# Patient Record
(demographics unavailable — no encounter records)

---

## 2024-11-12 NOTE — REVIEW OF SYSTEMS
[Eye Discharge] : eye discharge [Eye Redness] : eye redness [Nasal Congestion] : nasal congestion [Penile Tenderness] : penile tenderness [Penile Reddening] : penile reddening

## 2024-11-12 NOTE — DISCUSSION/SUMMARY
[FreeTextEntry1] : symptomatic treatment of eyes, warm soaks, hot wash linens, hand washing,  sx treatment, penis care with water wipes, barrier after voiding, increased fluids, mom knows to call if symptoms worsening

## 2024-11-12 NOTE — PHYSICAL EXAM
[Conjuctival Injection] : conjunctival injection [Discharge] : discharge [Left] : (left) [Mucoid Discharge] : mucoid discharge [NL] : clear to auscultation bilaterally [Soft] : soft [Tender] : nontender [Normal External Genitalia] : normal external genitalia [FreeTextEntry9] : GT button in place [FreeTextEntry6] : mild redness without rash at penis, vaseline applied

## 2024-11-12 NOTE — HISTORY OF PRESENT ILLNESS
[de-identified] : discharge left eye [FreeTextEntry6] : Garrett is here with 2 day history of congestion, decreased appetite and intermittent yellow discharge left eye, no fever, now with redness. ALSO noted redness at penile redness at sulcus.

## 2024-12-23 NOTE — HISTORY OF PRESENT ILLNESS
[de-identified] : Nano Pediatric Peptide 1.5cal/ml via g tube the patient is only getting 1 feeding per day through the G-tube.  210 mL. [de-identified] : No risks identified

## 2024-12-23 NOTE — DISCUSSION/SUMMARY
[Normal Growth] : growth [Normal Development] : development [FreeTextEntry1] : Right now, feeding is the issue.  He is doing much better.  He is eating solid foods.  The goal would be to completely stop using the G-tube.  He has a visit with GI next week.

## 2024-12-23 NOTE — HISTORY OF PRESENT ILLNESS
[de-identified] : Nano Pediatric Peptide 1.5cal/ml via g tube the patient is only getting 1 feeding per day through the G-tube.  210 mL. [de-identified] : No risks identified

## 2024-12-31 NOTE — HISTORY OF PRESENT ILLNESS
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 Eric Beach on 2/10/23 and presents for f/u evaluation. 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 His postop period was uneventful and he followed with [facility] for his initial postop visit.  He is s/p ileostomy reversal and GT tube placement. He is following with PEDS GI at [facility] for intestinal rehab and making slow progress with weight gain. He gets ST, OT, PT and is progressing nicely.  DUTCH is doing well from a cardiac standpoint per his mother. She denies cyanosis or tachypnea. He runs around without any shortness of breath. DUTCH is almost completely transitioned to po feeds.

## 2024-12-31 NOTE — CONSULT LETTER
[Today's Date] : [unfilled] [Name] : Name: [unfilled] [] : : ~~ [Today's Date:] : [unfilled] [Dear  ___:] : Dear Dr. [unfilled]: [Consult] : I had the pleasure of evaluating your patient, [unfilled]. My full evaluation follows. [Consult - Single Provider] : Thank you very much for allowing me to participate in the care of this patient. If you have any questions, please do not hesitate to contact me. [Sincerely,] : Sincerely, [DrKeagan  ___] : Dr. GUERRA [FreeTextEntry4] : HALLIE SANCHEZ  [de-identified] : Vladimir Chakraborty MD, FAAP, FACC  Pediatric Cardiologist  of Pediatrics [facility]

## 2024-12-31 NOTE — REASON FOR VISIT
[Follow-Up] : a follow-up visit for [Mother] : mother [Medical Records] : medical records [FreeTextEntry3] : s/p TOF Repair

## 2024-12-31 NOTE — DISCUSSION/SUMMARY
[FreeTextEntry1] : DUTCH is doing well from a cardiovascular standpoint. His anatomic repair is excellent without any significant residual cardiac issues. His biventricular function is qualitatively normal. I reassured his mother that DUTCH is doing well from a cardiac standpoint and that at this point there is no need for a routine cath in the foreseeable future.  DUTCH may participate in all physical activities without restriction.  DUTCH should follow-up in 12 months or sooner if any concerns arise.    [Needs SBE Prophylaxis] : [unfilled]  needs bacterial endocarditis prophylaxis. SBE prophylaxis is indicated for dental and invasive ENT procedures. (Circulation. 2007; 116: 9623-8500) [May participate in all age-appropriate activities] : [unfilled] May participate in all age-appropriate activities.

## 2024-12-31 NOTE — CONSULT LETTER
[Today's Date] : [unfilled] [Name] : Name: [unfilled] [] : : ~~ [Today's Date:] : [unfilled] [Dear  ___:] : Dear Dr. [unfilled]: [Consult] : I had the pleasure of evaluating your patient, [unfilled]. My full evaluation follows. [Consult - Single Provider] : Thank you very much for allowing me to participate in the care of this patient. If you have any questions, please do not hesitate to contact me. [Sincerely,] : Sincerely, [DrKeagan  ___] : Dr. GUERRA [FreeTextEntry4] : HALLIE SANCHEZ  [de-identified] : Vladimir Chakraborty MD, FAAP, FACC  Pediatric Cardiologist  of Pediatrics [facility]

## 2024-12-31 NOTE — DISCUSSION/SUMMARY
[FreeTextEntry1] : DUTCH is doing well from a cardiovascular standpoint. His anatomic repair is excellent without any significant residual cardiac issues. His biventricular function is qualitatively normal. I reassured his mother that DUTCH is doing well from a cardiac standpoint and that at this point there is no need for a routine cath in the foreseeable future.  DUTCH may participate in all physical activities without restriction.  DUTCH should follow-up in 12 months or sooner if any concerns arise.    [Needs SBE Prophylaxis] : [unfilled]  needs bacterial endocarditis prophylaxis. SBE prophylaxis is indicated for dental and invasive ENT procedures. (Circulation. 2007; 116: 1785-2143) [May participate in all age-appropriate activities] : [unfilled] May participate in all age-appropriate activities.

## 2024-12-31 NOTE — CONSULT LETTER
[Today's Date] : [unfilled] [Name] : Name: [unfilled] [] : : ~~ [Today's Date:] : [unfilled] [Dear  ___:] : Dear Dr. [unfilled]: [Consult] : I had the pleasure of evaluating your patient, [unfilled]. My full evaluation follows. [Consult - Single Provider] : Thank you very much for allowing me to participate in the care of this patient. If you have any questions, please do not hesitate to contact me. [Sincerely,] : Sincerely, [DrKeagan  ___] : Dr. GUERRA [FreeTextEntry4] : HALLIE SANCHEZ  [de-identified] : Vladimir Chakraborty MD, FAAP, FACC  Pediatric Cardiologist  of Pediatrics [facility] [place]

## 2024-12-31 NOTE — CARDIOLOGY SUMMARY
[Today's Date] : [unfilled] [FreeTextEntry1] : Normal sinus rhythm. Normal axis and intervals without chamber enlargement or hypertrophy. Right ventricular conduction delay. HR (bpm): 146 [FreeTextEntry2] : 1. Tetralogy of Fallot with pulmonary atresia and major aortopulmonary collateral arteries, s/p unifocalization of multiple aortopulmonary collaterals, status post closure of anterior malalignment ventricular septal defect and placement of valved right ventricle to pulmonary artery conduit. 2. 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 3. No residual peripatch defect identified. 4. There is no stenosis and mild regurgitation of the RV-PA aortic homograft. 5. Normal Doppler profile in main pulmonary artery, normal Doppler profile in right pulmonary artery and normal Doppler profile in left pulmonary artery. 6. Normal left ventricular size, morphology and systolic function. 7. Paradoxical septal motion of interventricular septum. 8. Normal right ventricular morphology with qualitatively normal size and systolic function. 9. Mildly dilated aortic root. 10. Trivial aortic valve regurgitation. 11. No pericardial effusion. 12. There has been no significant interval change.

## 2024-12-31 NOTE — HISTORY OF PRESENT ILLNESS
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 Eric Beach on 2/10/23 and presents for f/u evaluation. 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 His postop period was uneventful and he followed with [facility] for his initial postop visit.  He is s/p ileostomy reversal and GT tube placement. He is following with PEDS GI at [facility] for intestinal rehab and making slow progress with weight gain. He gets ST, OT, PT and is progressing nicely.  DUTCH is doing well from a cardiac standpoint per his mother. She denies cyanosis or tachypnea. He runs around without any shortness of breath. DUTCH is almost completely transitioned to po feeds.

## 2024-12-31 NOTE — PHYSICAL EXAM
[General Appearance - Alert] : alert [General Appearance - In No Acute Distress] : in no acute distress [General Appearance - Well Nourished] : well nourished [General Appearance - Well Developed] : well developed [General Appearance - Well-Appearing] : well appearing [Appearance Of Head] : the head was normocephalic [Facies] : there were no dysmorphic facial features [Sclera] : the conjunctiva were normal [Outer Ear] : the ears and nose were normal in appearance [Examination Of The Oral Cavity] : mucous membranes were moist and pink [Auscultation Breath Sounds / Voice Sounds] : breath sounds clear to auscultation bilaterally [Normal Chest Appearance] : the chest was normal in appearance [Chest Surgical / Traumatic Scar] : chest incision well healed [Apical Impulse] : quiet precordium with normal apical impulse [Heart Rate And Rhythm] : normal heart rate and rhythm [Heart Sounds] : normal S1 and S2 [Heart Sounds Gallop] : no gallops [Heart Sounds Pericardial Friction Rub] : no pericardial rub [Edema] : no edema [Arterial Pulses] : normal upper and lower extremity pulses with no pulse delay [Heart Sounds Click] : no clicks [Capillary Refill Test] : normal capillary refill [Systolic] : systolic [I] : a grade 1/6  [LUSB] : LUSB [Ejection] : ejection [Abdomen Soft] : soft [Nondistended] : nondistended [Abdomen Tenderness] : non-tender [Feeding Tube] : a feeding tube was present [Feeding Tube G] : (G-tube) [Normal] : normal [Nail Clubbing] : no clubbing  or cyanosis of the fingers [Motor Tone] : normal muscle strength and tone [] : no rash [Skin Lesions] : no lesions [Skin Turgor] : normal turgor [Demonstrated Behavior - Infant Nonreactive To Parents] : interactive [Mood] : mood and affect were appropriate for age [Demonstrated Behavior] : normal behavior

## 2024-12-31 NOTE — DISCUSSION/SUMMARY
[FreeTextEntry1] : DUTCH is doing well from a cardiovascular standpoint. His anatomic repair is excellent without any significant residual cardiac issues. His biventricular function is qualitatively normal. I reassured his mother that DUTCH is doing well from a cardiac standpoint and that at this point there is no need for a routine cath in the foreseeable future.  DUTCH may participate in all physical activities without restriction.  DUTCH should follow-up in 12 months or sooner if any concerns arise.    [Needs SBE Prophylaxis] : [unfilled]  needs bacterial endocarditis prophylaxis. SBE prophylaxis is indicated for dental and invasive ENT procedures. (Circulation. 2007; 116: 6017-0765) [May participate in all age-appropriate activities] : [unfilled] May participate in all age-appropriate activities.

## 2024-12-31 NOTE — HISTORY OF PRESENT ILLNESS
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 Eric Beach on 2/10/23 and presents for f/u evaluation. 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 His postop period was uneventful and he followed with [facility] for his initial postop visit.  He is s/p ileostomy reversal and GT tube placement. He is following with PEDS GI at [facility] for intestinal rehab and making slow progress with weight gain. He gets ST, OT, PT and is progressing nicely.  DUTCH is doing well from a cardiac standpoint per his mother. She denies cyanosis or tachypnea. He runs around without any shortness of breath. DUTCH is almost completely transitioned to po feeds.

## 2025-04-01 NOTE — PHYSICAL EXAM
[NL] : warm, clear [FreeTextEntry3] : TMs look perfect bilaterally [FreeTextEntry7] : Lungs are clear bilaterally with good air entry

## 2025-04-01 NOTE — HISTORY OF PRESENT ILLNESS
[FreeTextEntry6] : Patient has been sick for 1 day.  He has URI signs and symptoms and a cough.  He has fever over 101 F.  He has been exposed to both COVID and the flu.